# Patient Record
Sex: MALE | Race: WHITE | NOT HISPANIC OR LATINO | Employment: FULL TIME
[De-identification: names, ages, dates, MRNs, and addresses within clinical notes are randomized per-mention and may not be internally consistent; named-entity substitution may affect disease eponyms.]

---
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
## 2021-05-30 VITALS — WEIGHT: 108.4 LBS | BODY MASS INDEX: 16.06 KG/M2 | HEIGHT: 69 IN

## 2021-05-30 VITALS — WEIGHT: 109 LBS

## 2021-06-01 VITALS — WEIGHT: 116 LBS | HEIGHT: 70 IN | BODY MASS INDEX: 16.61 KG/M2

## 2021-06-08 NOTE — PROGRESS NOTES
Assessment/Plan:   1. Pre-op exam  2. Knee injury, right, initial encounter  Patient approved for surgery with general or local anesthesia. Postoperative pain to be managed by surgeon during post-operative [employer] [facility] timeframe, typically 30-60 days for major surgery, and less for others. Postoperative Care will be managed by Hospital Service. Labs will be done as indicated. Follow up as needed. No Cardiology consultation or non-invasive testing. No active cardiac conditions.   - Basic Metabolic Panel  - Hemoglobin    Subjective:   Scheduled Procedure:arthoscopic right knee  Surgery Date:  01/18/17  Surgery Location:  [place] [id number]  Surgeon:  Dr. Jewell    He sustained a right knee injury on 1/7/2017. He was walking when his knee gave out and he feel down. He has some pain and associated swelling and stiffness. He has been taking Advil as needed. His XR, 1/12/2017 was read as normal.     No current outpatient prescriptions on file.     No current facility-administered medications for this visit.        No Known Allergies    Immunization History   Administered Date(s) Administered     DTaP, historic 2000, 2000, 2000, 06/20/2001, 02/10/2005     Hep A, historic 04/06/2007, 10/18/2007     Hep B, historic 2000, 2000, 2000, 03/20/2001     HiB, historic 2000, 2000, 2000, 03/20/2001     IPV 2000, 2000, 2000, 02/10/2005     MMR 03/20/2001, 02/10/2005     Meningococcal MCV4P 04/09/2013     Pneumo Conj 7-V(before 2010) 2000, 2000, 03/20/2001, 06/20/2001     Tdap 07/20/2012     Varicella 03/20/2001, 04/06/2007       There is no problem list on file for this patient.      History reviewed. No pertinent past medical history.    Social History     Social History     Marital status: Single     Spouse name: N/A     Number of children: N/A     Years of education: N/A     Occupational History     Not on file.     Social History Main  "Topics     Smoking status: Never Smoker     Smokeless tobacco: Not on file     Alcohol use No     Drug use: No     Sexual activity: No     Other Topics Concern     Not on file     Social History Narrative    He lives with mom and dad. He has an older brother (5 years older).        History reviewed. No pertinent past surgical history.    History of Present Illness  Recent Health  Fever: no  Chills: no  Fatigue: no  Chest Pain: no  Cough: no  Dyspnea: no  Urinary Frequency: no  Nausea: no  Vomiting: no  Diarrhea: no  Abdominal Pain: no  Easy Bruising: no  Lower Extremity Swelling: no  Poor Exercise Tolerance: no    Most recent Health Maintenance Visit:  2014    Pertinent History  Prior Anesthesia: yes  Previous Anesthesia Reaction:  no  Diabetes: no  Cardiovascular Disease: no  Pulmonary Disease: no  Renal Disease: no  GI Disease: no  Sleep Apnea: no  Thromboembolic Problems: no  Clotting Disorder: no  Bleeding Disorder: no  Transfusion Reaction: no  Impaired Immunity: no  Steroid use in the last 6 months: no  Frequent Aspirin use: no    Family history of None    Social history of patient does not wear denture or partial plates    After surgery, the patient plans to recover at home with family.    Review of Systems  A 12 point comprehensive review of systems was negative except as noted.          Objective:         Vitals:    01/17/17 0811   BP: 106/56   Pulse: 70   SpO2: 99%   Weight: 108 lb 6.4 oz (49.2 kg)   Height: 5' 9.2\" (1.758 m)       Physical Exam:  Physical Exam:  General Appearance: Alert, cooperative, no distress, appears stated age  Head: Normocephalic, without obvious abnormality, atraumatic  Eyes: PERRL, conjunctiva/corneas clear, EOM's intact  Ears: Normal TM's and external ear canals, both ears  Nose: Nares normal, septum midline,mucosa normal, no drainage  Throat: Lips, mucosa, and tongue normal; teeth and gums normal  Neck: Supple, symmetrical, trachea midline, no adenopathy;  thyroid: not " enlarged, symmetric, no tenderness/mass/nodules; no carotid bruit or JVD  Back: Symmetric, no curvature, ROM normal, no CVA tenderness  Lungs: Clear to auscultation bilaterally, respirations unlabored  Heart: Regular rate and rhythm, S1 and S2 normal, no murmur, rub, or gallop,  Abdomen: Soft, non-tender, bowel sounds active all four quadrants,  no masses, no organomegaly  Musculoskeletal: Normal range of motion. No joint swelling or deformity. Right knee, pain with strength test. Right knee with moderate swelling and tenderness to palpation. Right knee brace.   Extremities: Extremities normal, atraumatic, no cyanosis or edema  Skin: Skin color, texture, turgor normal, no rashes or lesions  Lymph nodes: Cervical, supraclavicular, and axillary nodes normal  Neurologic: He is alert. He has normal reflexes.   Psychiatric: He has a normal mood and affect.      ADDITIONAL HISTORY SUMMARIZED (2): Reviewed note regarding knee pain, 1/12/2017.   DECISION TO OBTAIN EXTRA INFORMATION (1): None.   RADIOLOGY TESTS (1):Reviewed x-ray of right knee.   LABS (1): Labs ordered.   MEDICINE TESTS (1): None.  INDEPENDENT REVIEW (2 each): None.     The visit lasted a total of 14 minutes face to face with the patient. Over 50% of the time was spent counseling and educating the patient about knee surgery.    I, Jenny Huang, am scribing for and in the presence of, Promise Amajiri, FNP.    I, STAR Payne, personally performed the services described in this documentation, as scribed by Jenny Huang in my presence, and it is both accurate and complete.    Total data points: 4

## 2021-06-08 NOTE — PROGRESS NOTES
"Name: Jason Rutherford  Age: 16 y.o.  Gender: male  : 2000  Date of Encounter: 2017    ASSESSMENT/PLAN:  1. Knee injury, right, initial encounter  - XR Knee Right Plus Tunnel VW  - MR Knee Without Contrast Right; Future - r/u ACL injury/patellar dislocation  - Knee brace    Will f/u results of MRI once available  Will likely need ortho consult or PT  Rest, ibuprofen, ice   Patient declined crutches    Orders Placed This Encounter   Procedures     Knee brace     XR Knee Right Plus Tunnel VW     Order Specific Question:   Reason for Exam (Describe Symptoms):     Answer:   knee pain     Order Specific Question:   Can the procedure be changed per Radiologist protocol?     Answer:   Yes     MR Knee Without Contrast Right     Standing Status:   Future     Standing Expiration Date:   2018     Order Specific Question:   Reason for Exam (Describe Symptoms):     Answer:   acute knee injury - popping with swelling and stiffness     Order Specific Question:   Can the procedure be changed per Radiologist protocol?     Answer:   Yes     Order Specific Question:   What is the patient's sedation requirement?     Answer:   No Sedation     Order Specific Question:   If this is a diagnostic procedure, have the patient's age and recent imaging history been considered?     Answer:   Yes         Chief Complaint   Patient presents with     Knee Pain     right knee pain - was walking and knee \"gave out\"       HPI:  Jason Rutherford is a 16 y.o.  male who presents to the clinic, accompanied by mom, presenting with right knee pain. His pain started on 2017.  He walking in his house and he felt like his knee gave out and he fell down. He noted some popping. He does not think he twisted his knee. He got up and was able to walk, but he had pain and his knee felt strange following the incident. He currently has some pain, associated swelling and stiffness, and he has been walking with a slight limp. He has been taking Advil as " needed. He had a similar incident several months ago but thought maybe it was his left leg? He has never been seen before for knee issues. He is not involved in sports. He denies any trauma.     ROS:  Gen: otherwise healthly  MS: See HPI.   Skin: no bruising noted    Past Med / Surg History:  Healthy    Fam / Soc History:  No hx of knee issues in family  No sports or gym class      Objective:  Vitals:   Visit Vitals     /58 (Patient Site: Right Arm, Patient Position: Sitting, Cuff Size: Adult Regular)     Temp 98.2  F (36.8  C) (Oral)     Wt 109 lb (49.4 kg)     Wt Readings from Last 3 Encounters:   01/12/17 109 lb (49.4 kg) (4 %, Z= -1.76)*   07/20/12 68 lb 9 oz (31.1 kg) (4 %, Z= -1.76)*   08/12/09 54 lb (24.5 kg) (9 %, Z= -1.34)*     * Growth percentiles are based on [facility] 2-20 Years data.       PHYSICAL EXAM:  Gen: Alert, well appearing  Musculoskeletal: Moderate swelling of anterior right knee. No fluid noted behind knee. Mild tenderness with palpation over anterior knee. Negative Lachman test. Extension preserved, but decreased flexion. Some tenderness with varus and valgus stretches. Walking with a limp.   Skin: no bruising or erythema of knee    Right Knee X-Ray: Knee effusion noted, patellar alignment looks normal. Reviewed by radiology as negative.     DATA REVIEWED: 5 data points  Additional History from Old Records Summarized (2): Reviewed physical, 7/20/2012.   Decision to Obtain Records (1): None  Radiology Tests Summarized or Ordered (1): Ordered x-ray and MRI  Labs Reviewed or Ordered (1): None  Medicine Test Summarized or Ordered (1): None  Independent Review of EKG, X-RAY, or RAPID STREP (2 each): Reviewed right knee x-ray, see above.     The visit lasted a total of 16 minutes face to face with the patient. Over 50% of the time was spent counseling and educating the patient about right knee pain.    IJenny, am scribing for and in the presence of, Dr. De La Fuente.    IDr. De La Fuente,  personally performed the services described in this documentation, as scribed by Jenny Huang in my presence, and it is both accurate and complete.    Farheen De La Fuente MD  1/12/2017

## 2021-06-17 ENCOUNTER — OFFICE VISIT - HEALTHEAST (OUTPATIENT)
Dept: FAMILY MEDICINE | Facility: CLINIC | Age: 21
End: 2021-06-17

## 2021-06-17 ENCOUNTER — COMMUNICATION - HEALTHEAST (OUTPATIENT)
Dept: TELEHEALTH | Facility: CLINIC | Age: 21
End: 2021-06-17

## 2021-06-17 DIAGNOSIS — L30.4 INTERTRIGO: ICD-10-CM

## 2021-06-17 DIAGNOSIS — Z00.00 ANNUAL PHYSICAL EXAM: ICD-10-CM

## 2021-06-17 DIAGNOSIS — R22.0 FACIAL SWELLING: ICD-10-CM

## 2021-06-17 DIAGNOSIS — Z23 NEED FOR HPV VACCINATION: ICD-10-CM

## 2021-06-17 RX ORDER — KETOCONAZOLE 20 MG/G
CREAM TOPICAL 2 TIMES DAILY
Qty: 30 G | Refills: 0 | Status: SHIPPED | OUTPATIENT
Start: 2021-06-17

## 2021-06-17 ASSESSMENT — MIFFLIN-ST. JEOR: SCORE: 1718.86

## 2021-06-19 NOTE — PROGRESS NOTES
Assessment:      Healthy male exam.      Plan:      1. Annual physical exam     2. Need for HPV vaccination  HPV vaccine 9 valent 3 dose IM   3. Need for meningococcal vaccination  Meningococcal MCV4P      Immunizations updated today.  Follow-up in 2-3 years.  Can make a nurse only appointment for HPV series.     The patient lost consciousness following his immunizations for approximately 30 seconds.  He was pale and clammy.  The patient came to without falling or hitting his head.  Responding to verbal and physical stimulation. VSS. He was provided with a juice box to drink as he had not had breakfast this morning and after sitting for 15 minutes felt much better.  He was escorted out of the clinic with a clinical staff member under his own strength.  Subjective:      Jason Rutherford is a 18 y.o. male who presents for an annual exam. The patient reports that there is not domestic violence in his life.  He is about to start college next month at the [employer] and is excited.  He is planning on studying English but is open to change in his major once he gets there.  He eats 3 meals a day, but not always the most healthy food.  Exercise is minimal.  He does not perform testicular exams and was encouraged to do so.  No significant family medical history outside of father who had blood clots.    Healthy Habits:   Regular Exercise: No  Sunscreen Use: No  Healthy Diet: No  Dental Visits Regularly: Yes  Seat Belt: Yes  Sexually active: No  Monthly Self Testicular Exams:  No  Hemoccults: No  Flex Sig: No  Colonoscopy: No  Lipid Profile: Yes  Glucose Screen: Yes  Prevention of Osteoporosis: Yes  Last Dexa: No  Guns at Home:  No      Immunization History   Administered Date(s) Administered     DTaP, historic 2000, 2000, 2000, 06/20/2001, 02/10/2005     HPV 9 Valent 08/15/2018     Hep A, historic 04/06/2007, 10/18/2007     Hep B, historic 2000, 2000, 2000, 03/20/2001     HiB,  reviewed "historic,unspecified 2000, 2000, 2000, 03/20/2001     IPV 2000, 2000, 2000, 02/10/2005     MMR 03/20/2001, 02/10/2005     Meningococcal MCV4P 04/09/2013, 08/15/2018     Pneumo Conj 7-V(before 2010) 2000, 2000, 03/20/2001, 06/20/2001     Tdap 07/20/2012     Varicella 03/20/2001, 04/06/2007     Immunization status: due today: HPV and Meningicoccal.    No exam data present     No current outpatient prescriptions on file.     No current facility-administered medications for this visit.      No past medical history on file.  Past Surgical History:   Procedure Laterality Date     KNEE ARTHROSCOPY       Review of patient's allergies indicates no known allergies.  Family History   Problem Relation Age of Onset     Clotting disorder Father      Social History     Social History     Marital status: Single     Spouse name: N/A     Number of children: N/A     Years of education: N/A     Occupational History     Student/      Social History Main Topics     Smoking status: Never Smoker     Smokeless tobacco: Never Used     Alcohol use No     Drug use: No     Sexual activity: No     Other Topics Concern     Not on file     Social History Narrative    He lives with mom and dad. He has an older brother (5 years older).        Review of Systems  Review of Systems       History obtained from the patient  General ROS: negative  Psychological ROS: negative  Ophthalmic ROS: positive for - uses glasses  ENT ROS: negative  Allergy and Immunology ROS: negative  Hematological and Lymphatic ROS: negative  Endocrine ROS: negative  Breast ROS: negative  Respiratory ROS: negative  Cardiovascular ROS: negative  Gastrointestinal ROS: negative  Genito-Urinary ROS: negative  Musculoskeletal ROS: negative  Neurological ROS: negative  Dermatological ROS: negative      Objective:     Vitals:    08/15/18 0837   BP: 100/66   Pulse: 78   Temp: 98.5  F (36.9  C)   Weight: 116 lb (52.6 kg)   Height: 5' 9.75\" " (1.772 m)     Body mass index is 16.76 kg/(m^2).    Physical  Physical Exam     General appearance - alert, well appearing, and in no distress  Mental status - alert, oriented to person, place, and time  Eyes - pupils equal and reactive, extraocular eye movements intact  Ears - bilateral TM's and external ear canals normal  Nose - normal and patent, no erythema, discharge or polyps  Mouth - mucous membranes moist, pharynx normal without lesions  Neck - supple, no significant adenopathy  Lymphatics - no palpable lymphadenopathy, no hepatosplenomegaly  Chest - clear to auscultation, no wheezes, rales or rhonchi, symmetric air entry  Heart - normal rate, regular rhythm, normal S1, S2, no murmurs, rubs, clicks or gallops  Abdomen - soft, nontender, nondistended, no masses or organomegaly   Male - no penile lesions or discharge, no testicular masses or tenderness, no hernias  Back exam - full range of motion, no tenderness, palpable spasm or pain on motion  Neurological - alert, oriented, normal speech, no focal findings or movement disorder noted, neck supple without rigidity, cranial nerves II through XII intact, DTR's normal and symmetric  Musculoskeletal - no joint tenderness, deformity or swelling  Extremities - peripheral pulses normal, no pedal edema, no clubbing or cyanosis  Skin - normal coloration and turgor, no rashes, no suspicious skin lesions noted     Shane Parker, CNP

## 2021-06-26 NOTE — PATIENT INSTRUCTIONS - HE
Next HPV given today.    I sent ketoconazole for the rash on the buttocks. Use twice daily. Keep the area dry.    I placed the referral to allergy. They should be calling you.

## 2021-06-26 NOTE — PROGRESS NOTES
Assessment:      Healthy male exam.      Plan:      1. Annual physical exam     2. Facial swelling  Ambulatory referral to Allergy   3. Need for HPV vaccination  HPV vaccine 9 valent 3 dose IM   4. Intertrigo  ketoconazole (NIZORAL) 2 % cream     Given facial swelling issues with certain foods, recommend evaluation with allergy medicine.  Avoid known triggers.  Ketoconazole for suspected intertrigo.  Try to keep the area dry.  Update HPV vaccine.  Counseled on advanced healthcare directives.  No red flags on exam regarding the dry cough.  Likely irritated from salty foods.  Try to avoid.  If persisting or evolving, let me know.    Subjective:      Jason Rutherford is a 21 y.o. male who presents for an annual exam. The patient reports that there is not domestic violence in his life.     Overall patient is doing okay.  He has started to develop an itchy rash in between his buttocks for the past week.  No new soaps, lotions, detergents.  No travel to wooded areas.    Patient also notes a cough issue particularly when he eats very salty foods or gets dehydrated.  No reflux.  No wheezing or tightness in the chest.  No hemoptysis.  No shortness of breath.  No chest pains.    Patient has noticed that with eating certain foods like chicken and eggs, his lips will start to swell up and become itchy.  Recently he was eating a prepared-and developed significant facial swelling and itching.  He drank a lot of water to combat this and eventually it self resolved.  He does have a family history of some significant food allergies and wonders about getting tested.    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  Currently studying social studies education.  He and his significant other just celebrated their 1 year anniversary    Healthy Habits:   Regular Exercise: Yes-gym 3 times a week.    Sunscreen Use: Yes  Healthy Diet: No  Dental Visits Regularly: Yes  Seat Belt: Yes  Sexually active: N/A  Monthly Self Testicular Exams:   Yes  Hemoccults: No  Flex Sig: No  Colonoscopy: No  Lipid Profile: No  Glucose Screen: No  Prevention of Osteoporosis: Yes  Last Dexa: No  Guns at Home:  No      Immunization History   Administered Date(s) Administered     COVID-19,PF,Moderna 04/15/2021, 05/13/2021     DTaP, historic 2000, 2000, 2000, 06/20/2001, 02/10/2005     HPV 9 Valent 08/15/2018     Hep A, historic 04/06/2007, 10/18/2007     Hep B, historic 2000, 2000, 2000, 03/20/2001     HiB, historic,unspecified 2000, 2000, 2000, 03/20/2001     IPV 2000, 2000, 2000, 02/10/2005     MMR 03/20/2001, 02/10/2005     Meningococcal MCV4P 04/09/2013, 08/15/2018     Pneumo Conj 7-V(before 2010) 2000, 2000, 03/20/2001, 06/20/2001     Tdap 07/20/2012     Varicella 03/20/2001, 04/06/2007     Immunization status: up to date and documented, due today.    No exam data present     No current outpatient medications on file.     No current facility-administered medications for this visit.      No past medical history on file.  Past Surgical History:   Procedure Laterality Date     KNEE ARTHROSCOPY       Patient has no known allergies.  Family History   Problem Relation Age of Onset     Clotting disorder Father      Social History     Socioeconomic History     Marital status: Single     Spouse name: Not on file     Number of children: Not on file     Years of education: Not on file     Highest education level: Not on file   Occupational History     Occupation: Student/   Social Needs     Financial resource strain: Not on file     Food insecurity     Worry: Not on file     Inability: Not on file     Transportation needs     Medical: Not on file     Non-medical: Not on file   Tobacco Use     Smoking status: Never Smoker     Smokeless tobacco: Never Used   Substance and Sexual Activity     Alcohol use: No     Drug use: No     Sexual activity: Never   Lifestyle     Physical activity     Days per  week: Not on file     Minutes per session: Not on file     Stress: Not on file   Relationships     Social connections     Talks on phone: Not on file     Gets together: Not on file     Attends Denominational service: Not on file     Active member of club or organization: Not on file     Attends meetings of clubs or organizations: Not on file     Relationship status: Not on file     Intimate partner violence     Fear of current or ex partner: Not on file     Emotionally abused: Not on file     Physically abused: Not on file     Forced sexual activity: Not on file   Other Topics Concern     Not on file   Social History Narrative    He lives with mom and dad. He has an older brother (5 years older).        Review of Systems  Review of Systems      Review of Systems - Negative except dry cough with salty foods, facial swelling with certain foods.  Rash between the buttocks      Objective:     There were no vitals filed for this visit.  There is no height or weight on file to calculate BMI.    Physical  Physical Exam     General appearance - alert, well appearing, and in no distress, oriented to person, place, and time and normal appearing weight  Mental status - alert, oriented to person, place, and time, normal mood, behavior, speech, dress, motor activity, and thought processes  Eyes - pupils equal and reactive, extraocular eye movements intact  Ears - bilateral TM's and external ear canals normal  Nose - normal and patent, no erythema, discharge or polyps  Mouth - mucous membranes moist, pharynx normal without lesions  Neck - supple, no significant adenopathy, carotids upstroke normal bilaterally, no bruits  Lymphatics - no palpable lymphadenopathy, no hepatosplenomegaly  Chest - clear to auscultation, no wheezes, rales or rhonchi, symmetric air entry  Heart - normal rate, regular rhythm, normal S1, S2, no murmurs, rubs, clicks or gallops  Abdomen - soft, nontender, nondistended, no masses or organomegaly  Back exam -  full range of motion, no tenderness, palpable spasm or pain on motion  Neurological - alert, oriented, normal speech, no focal findings or movement disorder noted, neck supple without rigidity, cranial nerves II through XII intact, motor and sensory grossly normal bilaterally, normal muscle tone, no tremors, strength 5/5  Musculoskeletal - no joint tenderness, deformity or swelling  Extremities - peripheral pulses normal, no pedal edema, no clubbing or cyanosis  Skin -in between the buttocks there is an area that is mildly erythematous.  Hyperpigmentation.  No blistering.  No bleeding.    Shane Parker, CNP

## 2021-07-03 NOTE — ADDENDUM NOTE
Addendum Note by Corey De La Fuente MD at 1/16/2017  9:15 AM     Author: Corey De La Fuente MD Service: -- Author Type: Physician    Filed: 1/16/2017  9:15 AM Encounter Date: 1/12/2017 Status: Signed    : Corey De La Fuente MD (Physician)    Addended by: COREY DE LA FUENTE on: 1/16/2017 09:15 AM        Modules accepted: Orders

## 2021-07-06 VITALS
SYSTOLIC BLOOD PRESSURE: 115 MMHG | OXYGEN SATURATION: 100 % | DIASTOLIC BLOOD PRESSURE: 76 MMHG | HEIGHT: 70 IN | HEART RATE: 100 BPM | WEIGHT: 156 LBS | TEMPERATURE: 98.1 F | BODY MASS INDEX: 22.33 KG/M2

## 2021-08-03 ENCOUNTER — OFFICE VISIT (OUTPATIENT)
Dept: ALLERGY | Facility: CLINIC | Age: 21
End: 2021-08-03
Payer: COMMERCIAL

## 2021-08-03 VITALS — OXYGEN SATURATION: 98 % | HEIGHT: 70 IN | WEIGHT: 154.2 LBS | BODY MASS INDEX: 22.07 KG/M2 | HEART RATE: 90 BPM

## 2021-08-03 DIAGNOSIS — Z91.018 FOOD ALLERGY: ICD-10-CM

## 2021-08-03 DIAGNOSIS — T78.40XD ALLERGIC REACTION, SUBSEQUENT ENCOUNTER: Primary | ICD-10-CM

## 2021-08-03 PROCEDURE — 95004 PERQ TESTS W/ALRGNC XTRCS: CPT | Performed by: ALLERGY & IMMUNOLOGY

## 2021-08-03 PROCEDURE — 86003 ALLG SPEC IGE CRUDE XTRC EA: CPT | Performed by: ALLERGY & IMMUNOLOGY

## 2021-08-03 PROCEDURE — 36415 COLL VENOUS BLD VENIPUNCTURE: CPT | Performed by: ALLERGY & IMMUNOLOGY

## 2021-08-03 PROCEDURE — 86003 ALLG SPEC IGE CRUDE XTRC EA: CPT | Mod: 90 | Performed by: ALLERGY & IMMUNOLOGY

## 2021-08-03 PROCEDURE — 99204 OFFICE O/P NEW MOD 45 MIN: CPT | Mod: 25 | Performed by: ALLERGY & IMMUNOLOGY

## 2021-08-03 PROCEDURE — 99000 SPECIMEN HANDLING OFFICE-LAB: CPT | Performed by: ALLERGY & IMMUNOLOGY

## 2021-08-03 RX ORDER — EPINEPHRINE 0.3 MG/.3ML
INJECTION SUBCUTANEOUS
Qty: 4 EACH | Refills: 0 | Status: SHIPPED | OUTPATIENT
Start: 2021-08-03

## 2021-08-03 ASSESSMENT — MIFFLIN-ST. JEOR: SCORE: 1710.7

## 2021-08-03 NOTE — PROGRESS NOTES
"        Subjective       HPI chief complaint: Concern for food allergy    History of present illness: This is a pleasant 21-year-old gentleman I was asked to see for evaluation by Shane Parker in regards to an allergic reaction.  Patient states for many years since he was a child he will develop lip swelling and throat itching when he eats chicken.  Happens immediately after eating food.  He avoids chicken for this reason.  He states most recently he had a protein powder that he bought from [employer].  He states it was the equate brand.  The protein powder contained pea powder and quinoa.  We are able to see the label.  He states immediately upon eating a similar reaction happened.  He states when this happens he takes Benadryl and water and the symptoms to resolve.  He does not have an epinephrine device.  Is never tested.  Has not happened outside of these foods.  No history of seasonal allergies or asthma.  No history of eczema.    Past medical history: Otherwise unremarkable    Social history: Non-smoker    Family history: Positive for allergy, possibly pea allergy        Review of Systems   Constitutional, HEENT, cardiovascular, pulmonary, GI, , musculoskeletal, neuro, skin, endocrine and psych systems are negative, except as otherwise noted.      Objective    Pulse 90   Ht 1.778 m (5' 10\")   Wt 69.9 kg (154 lb 3.2 oz)   SpO2 98%   BMI 22.13 kg/m    Body mass index is 22.13 kg/m .  Physical Exam      Gen: Pleasant male not in acute distress  HEENT: Eyes no erythema of the bulbar or palpebral conjunctiva, no edema. Ears: No deformities or lesions. Nose: No congestion, mucosa normal. Mouth: Throat clear, no lip or tongue edema.   Neck: No masses lesions or swelling  Respiratory: No coughing with breathing, no retractions  Lymph: No visible supraclavicular or cervical lymphadenopathy  Skin: No rashes or lesions  Psych: Alert and oriented times 3    3 percutaneous test were placed, positive histamine control, " positive test to chicken.    Impression report and plan:  1.  Chicken allergy  2.  Allergic reaction    Reviewed food allergy action plan.  Complete avoidance of chicken.  Epinephrine device prescribed.  Check specific IgE to pea and quinoa.  Avoid these foods for now.  I will contact patient with results.

## 2021-08-03 NOTE — LETTER
"    8/3/2021         RE: Jason Rutherford  [address] [facility] [address]        Dear Colleague,    Thank you for referring your patient, Jason Rutherford, to the [facility]. Testing to chicken was positive. Please see a copy of my visit note below.            Subjective       HPI chief complaint: Concern for food allergy    History of present illness: This is a pleasant 21-year-old gentleman I was asked to see for evaluation by Shane Parker in regards to an allergic reaction.  Patient states for many years since he was a child he will develop lip swelling and throat itching when he eats chicken.  Happens immediately after eating food.  He avoids chicken for this reason.  He states most recently he had a protein powder that he bought from [employer].  He states it was the equate brand.  The protein powder contained pea powder and quinoa.  We are able to see the label.  He states immediately upon eating a similar reaction happened.  He states when this happens he takes Benadryl and water and the symptoms to resolve.  He does not have an epinephrine device.  Is never tested.  Has not happened outside of these foods.  No history of seasonal allergies or asthma.  No history of eczema.    Past medical history: Otherwise unremarkable    Social history: Non-smoker    Family history: Positive for allergy, possibly pea allergy        Review of Systems   Constitutional, HEENT, cardiovascular, pulmonary, GI, , musculoskeletal, neuro, skin, endocrine and psych systems are negative, except as otherwise noted.      Objective    Pulse 90   Ht 1.778 m (5' 10\")   Wt 69.9 kg (154 lb 3.2 oz)   SpO2 98%   BMI 22.13 kg/m    Body mass index is 22.13 kg/m .  Physical Exam      Gen: Pleasant male not in acute distress  HEENT: Eyes no erythema of the bulbar or palpebral conjunctiva, no edema. Ears: No deformities or lesions. Nose: No congestion, mucosa normal. Mouth: Throat clear, no lip or tongue " edema.   Neck: No masses lesions or swelling  Respiratory: No coughing with breathing, no retractions  Lymph: No visible supraclavicular or cervical lymphadenopathy  Skin: No rashes or lesions  Psych: Alert and oriented times 3    3 percutaneous test were placed, positive histamine control, positive test to chicken.    Impression report and plan:  1.  Chicken allergy  2.  Allergic reaction    Reviewed food allergy action plan.  Complete avoidance of chicken.  Epinephrine device prescribed.  Check specific IgE to pea and quinoa.  Avoid these foods for now.  I will contact patient with results.              Again, thank you for allowing me to participate in the care of your patient.        Sincerely,        Omaira PERALTA MD

## 2021-08-04 LAB
Lab: NORMAL
PERFORMING LABORATORY: NORMAL
SPECIMEN STATUS: NORMAL
TEST NAME: NORMAL

## 2021-08-06 LAB
DEPRECATED MISC ALLERGEN IGE RAST QL: NORMAL
MISCELLANEOUS TEST 1 (ARUP): ABNORMAL
PEA IGE QN: 5.82 KU(A)/L

## 2021-10-16 ENCOUNTER — HEALTH MAINTENANCE LETTER (OUTPATIENT)
Age: 21
End: 2021-10-16

## 2022-07-23 ENCOUNTER — HEALTH MAINTENANCE LETTER (OUTPATIENT)
Age: 22
End: 2022-07-23

## 2022-10-01 ENCOUNTER — HEALTH MAINTENANCE LETTER (OUTPATIENT)
Age: 22
End: 2022-10-01

## 2023-08-06 ENCOUNTER — HEALTH MAINTENANCE LETTER (OUTPATIENT)
Age: 23
End: 2023-08-06